# Patient Record
Sex: FEMALE
[De-identification: names, ages, dates, MRNs, and addresses within clinical notes are randomized per-mention and may not be internally consistent; named-entity substitution may affect disease eponyms.]

---

## 2020-10-28 NOTE — MRI
MRI LEFT KNEE WITHOUT CONTRAST:

 

Date:  10/28/2020

 

HISTORY:  

Pain. History of old meniscus tear. 

 

COMPARISON:  

None. 

 

FINDINGS:

 

Medial Meniscus:  

High grade radial tear posterior horn medial meniscus involving the free edge, intermediate, and port
ions of the red zone, 6 mm from the footprint. There is also an undersurface flap tear of the medial 
meniscal body. 3 mm medial gutter extrusion with some loss of hoop stress. 

 

Lateral Meniscus:

Free edge fraying and minimal undersurface lateral meniscal body. 

 

ACL and PCL are intact, as well as the MCL and LCL. 

 

Extensor Mechanism:

Quadriceps tendon, patella, and patella tendon are all intact. 

 

Cartilage:

Patellofemoral compartment:  Full thickness chondral fissure to the lateral trochlea with subcortical
 reactive marrow change. 

Medial compartment:  Few full thickness chondral fissures of the anterior weightbearing surface media
l tibial plateau with a few subcortical reaction marrow change cysts. 

Lateral compartment:  Low grade chondral fraying. 

 

Muscles:

Muscle signal and bulk is normal. 

 

IMPRESSION: 

 

1.  High grade radial tear posterior horn medial meniscus 6 mm from the footprint near the root invol
ving the free edge, intermediate, and portions of red zone. 

 

2.  Undersurface flap tear medial meniscal body with gutter extrusion and some loss of hoop stress. 

 

3.  A few foci of full thickness chondral fissures of the lateral trochlea, as well as of the anterio
r weightbearing surface medial tibial plateau. 

 

 

 

POS: [facility]

## 2020-12-03 NOTE — EKG
Test Reason : 

Blood Pressure : ***/*** mmHG

Vent. Rate : 059 BPM     Atrial Rate : 059 BPM

   P-R Int : 152 ms          QRS Dur : 090 ms

    QT Int : 418 ms       P-R-T Axes : 041 066 051 degrees

   QTc Int : 413 ms

 

Sinus bradycardia

Otherwise normal ECG

No previous ECGs available

Confirmed by DR. HAO BUSTOS ([id number]) on 12/3/2020 7:02:43 AM

 

Referred By:  CODY QUILES           Confirmed By:DR. HAO BUSTOS

## 2020-12-04 NOTE — OP
DATE OF PROCEDURE:  12/04/2020



PROCEDURE PERFORMED:  Left knee arthroscopic partial medial and lateral meniscectomy.



PREOPERATIVE DIAGNOSIS:  Medial meniscus tear with some degenerative changes of

medial compartment. 



POSTOPERATIVE DIAGNOSIS:  Medial meniscus tear with some degenerative changes of

medial compartment with small lateral meniscus tear. 



ANESTHESIA:  General.



ESTIMATED BLOOD LOSS:  Minimal.



SPECIMEN:  None.



DRAINS:  None.



COMPLICATION:  None.



FINDINGS OF SURGERY:  Complex tear involving most of  the posterior medial meniscus,

some grade 3 chondromalacia medial femoral condyle with unstable flap tears.  Intact

ACL.  Lateral compartment with a central edge lateral meniscus tear.  Scope was

placed in the lateral portal.  Probe was placed in the medial portal.  I debrided

the medial meniscus with basket forceps and then shaved this with a full-radius

resector, removing loose bodies and smoothing the meniscus.  I did this repeatedly

alternating until had a stable medial meniscus.  The lateral meniscus had a tear,

which was fairly small, was able to be removed with a shaver.  I swept the gutters

for loose bodies, there were none. I swept the superior aspect of the pouch to make

sure there were no loose bodies in this area, irrigated the knee and drained and

sterile dressings applied. 







Job ID:  [id number]

## 2022-10-05 ENCOUNTER — HOSPITAL ENCOUNTER (OUTPATIENT)
Dept: HOSPITAL 92 - DTY/OP | Age: 62
Discharge: HOME | End: 2022-10-05
Attending: SURGERY
Payer: COMMERCIAL

## 2022-10-05 DIAGNOSIS — E66.01: Primary | ICD-10-CM

## 2022-10-05 PROCEDURE — 97802 MEDICAL NUTRITION INDIV IN: CPT

## 2022-11-14 ENCOUNTER — HOSPITAL ENCOUNTER (INPATIENT)
Dept: HOSPITAL 92 - SURG A | Age: 62
LOS: 17 days | Discharge: HOME | DRG: 621 | End: 2022-12-01
Attending: SURGERY | Admitting: SURGERY
Payer: COMMERCIAL

## 2022-11-14 ENCOUNTER — HOSPITAL ENCOUNTER (OUTPATIENT)
Dept: HOSPITAL 92 - LABBT | Age: 62
Discharge: HOME | End: 2022-11-14
Attending: SURGERY
Payer: COMMERCIAL

## 2022-11-14 VITALS — BODY MASS INDEX: 40.8 KG/M2

## 2022-11-14 DIAGNOSIS — E66.01: Primary | ICD-10-CM

## 2022-11-14 DIAGNOSIS — Z79.899: ICD-10-CM

## 2022-11-14 DIAGNOSIS — H40.9: ICD-10-CM

## 2022-11-14 DIAGNOSIS — Z79.890: ICD-10-CM

## 2022-11-14 DIAGNOSIS — Z88.2: ICD-10-CM

## 2022-11-14 DIAGNOSIS — Z01.818: Primary | ICD-10-CM

## 2022-11-14 DIAGNOSIS — Z20.822: ICD-10-CM

## 2022-11-14 DIAGNOSIS — D64.9: ICD-10-CM

## 2022-11-14 DIAGNOSIS — Z79.82: ICD-10-CM

## 2022-11-14 DIAGNOSIS — Z90.49: ICD-10-CM

## 2022-11-14 DIAGNOSIS — E03.9: ICD-10-CM

## 2022-11-14 LAB
ALBUMIN SERPL BCG-MCNC: 4 G/DL (ref 3.5–5)
ALP SERPL-CCNC: 85 U/L (ref 40–110)
ALT SERPL W P-5'-P-CCNC: 16 U/L (ref 8–55)
ANION GAP SERPL CALC-SCNC: 12 MMOL/L (ref 10–20)
AST SERPL-CCNC: 20 U/L (ref 5–34)
BASOPHILS # BLD AUTO: 0.1 10X3/UL (ref 0–0.2)
BASOPHILS NFR BLD AUTO: 0.9 % (ref 0–2)
BILIRUB SERPL-MCNC: 0.5 MG/DL (ref 0.2–1.2)
BUN SERPL-MCNC: 11 MG/DL (ref 9.8–20.1)
CALCIUM SERPL-MCNC: 9.1 MG/DL (ref 7.8–10.44)
CHLORIDE SERPL-SCNC: 107 MMOL/L (ref 98–107)
CO2 SERPL-SCNC: 26 MMOL/L (ref 22–29)
CREAT CL PREDICTED SERPL C-G-VRATE: 0 ML/MIN (ref 70–130)
EOSINOPHIL # BLD AUTO: 0.2 10X3/UL (ref 0–0.5)
EOSINOPHIL NFR BLD AUTO: 3.1 % (ref 0–6)
GLOBULIN SER CALC-MCNC: 3 G/DL (ref 2.4–3.5)
GLUCOSE SERPL-MCNC: 97 MG/DL (ref 70–105)
HGB BLD-MCNC: 13.2 G/DL (ref 12–15.5)
LYMPHOCYTES NFR BLD AUTO: 31.2 % (ref 18–47)
MCH RBC QN AUTO: 30.8 PG (ref 27–33)
MCV RBC AUTO: 90.4 FL (ref 81.6–98.3)
MONOCYTES # BLD AUTO: 0.4 10X3/UL (ref 0–1.1)
MONOCYTES NFR BLD AUTO: 7 % (ref 0–10)
NEUTROPHILS # BLD AUTO: 3.2 10X3/UL (ref 1.5–8.4)
NEUTROPHILS NFR BLD AUTO: 57.4 % (ref 40–75)
PLATELET # BLD AUTO: 229 10X3/UL (ref 150–450)
POTASSIUM SERPL-SCNC: 4.3 MMOL/L (ref 3.5–5.1)
RBC # BLD AUTO: 4.29 10X6/UL (ref 3.9–5.03)
SODIUM SERPL-SCNC: 141 MMOL/L (ref 136–145)
WBC # BLD AUTO: 5.6 10X3/UL (ref 3.5–10.5)

## 2022-11-14 PROCEDURE — C9113 INJ PANTOPRAZOLE SODIUM, VIA: HCPCS

## 2022-11-14 PROCEDURE — 80053 COMPREHEN METABOLIC PANEL: CPT

## 2022-11-14 PROCEDURE — 83036 HEMOGLOBIN GLYCOSYLATED A1C: CPT

## 2022-11-14 PROCEDURE — 85025 COMPLETE CBC W/AUTO DIFF WBC: CPT

## 2022-11-14 PROCEDURE — U0002 COVID-19 LAB TEST NON-CDC: HCPCS

## 2022-11-14 PROCEDURE — 71046 X-RAY EXAM CHEST 2 VIEWS: CPT

## 2022-11-14 PROCEDURE — 80048 BASIC METABOLIC PNL TOTAL CA: CPT

## 2022-11-14 PROCEDURE — 88342 IMHCHEM/IMCYTCHM 1ST ANTB: CPT

## 2022-11-14 PROCEDURE — 36415 COLL VENOUS BLD VENIPUNCTURE: CPT

## 2022-11-14 PROCEDURE — 88307 TISSUE EXAM BY PATHOLOGIST: CPT

## 2022-11-30 PROCEDURE — 8E0W4CZ ROBOTIC ASSISTED PROCEDURE OF TRUNK REGION, PERCUTANEOUS ENDOSCOPIC APPROACH: ICD-10-PCS | Performed by: SURGERY

## 2022-11-30 PROCEDURE — 0DB64Z3 EXCISION OF STOMACH, PERCUTANEOUS ENDOSCOPIC APPROACH, VERTICAL: ICD-10-PCS | Performed by: SURGERY

## 2022-11-30 RX ADMIN — POTASSIUM CHLORIDE, DEXTROSE MONOHYDRATE AND SODIUM CHLORIDE SCH MLS: 150; 5; 450 INJECTION, SOLUTION INTRAVENOUS at 21:08

## 2022-11-30 RX ADMIN — POTASSIUM CHLORIDE, DEXTROSE MONOHYDRATE AND SODIUM CHLORIDE SCH: 150; 5; 450 INJECTION, SOLUTION INTRAVENOUS at 18:37

## 2022-11-30 RX ADMIN — POTASSIUM CHLORIDE, DEXTROSE MONOHYDRATE AND SODIUM CHLORIDE SCH: 150; 5; 450 INJECTION, SOLUTION INTRAVENOUS at 10:45

## 2022-12-01 VITALS — SYSTOLIC BLOOD PRESSURE: 130 MMHG | DIASTOLIC BLOOD PRESSURE: 69 MMHG | TEMPERATURE: 97.7 F

## 2022-12-01 LAB
ANION GAP SERPL CALC-SCNC: 12 MMOL/L (ref 10–20)
BASOPHILS # BLD AUTO: 0 THOU/UL (ref 0–0.2)
BASOPHILS NFR BLD AUTO: 0.1 % (ref 0–1)
BUN SERPL-MCNC: 10 MG/DL (ref 9.8–20.1)
CALCIUM SERPL-MCNC: 9.2 MG/DL (ref 7.8–10.44)
CHLORIDE SERPL-SCNC: 106 MMOL/L (ref 98–107)
CO2 SERPL-SCNC: 24 MMOL/L (ref 22–29)
CREAT CL PREDICTED SERPL C-G-VRATE: 132 ML/MIN (ref 70–130)
EOSINOPHIL # BLD AUTO: 0 THOU/UL (ref 0–0.7)
EOSINOPHIL NFR BLD AUTO: 0.1 % (ref 0–10)
GLUCOSE SERPL-MCNC: 132 MG/DL (ref 70–105)
HGB BLD-MCNC: 13.3 G/DL (ref 12–16)
LYMPHOCYTES # BLD: 1.1 THOU/UL (ref 1.2–3.4)
LYMPHOCYTES NFR BLD AUTO: 13 % (ref 21–51)
MCH RBC QN AUTO: 31.8 PG (ref 27–31)
MCV RBC AUTO: 95.3 FL (ref 78–98)
MONOCYTES # BLD AUTO: 0.5 THOU/UL (ref 0.11–0.59)
MONOCYTES NFR BLD AUTO: 5.8 % (ref 0–10)
NEUTROPHILS # BLD AUTO: 6.9 THOU/UL (ref 1.4–6.5)
NEUTROPHILS NFR BLD AUTO: 81 % (ref 42–75)
PLATELET # BLD AUTO: 198 10X3/UL (ref 130–400)
POTASSIUM SERPL-SCNC: 4.4 MMOL/L (ref 3.5–5.1)
RBC # BLD AUTO: 4.17 MILL/UL (ref 4.2–5.4)
SODIUM SERPL-SCNC: 138 MMOL/L (ref 136–145)
WBC # BLD AUTO: 8.5 10X3/UL (ref 4.8–10.8)